# Patient Record
Sex: MALE | Race: BLACK OR AFRICAN AMERICAN | NOT HISPANIC OR LATINO | URBAN - METROPOLITAN AREA
[De-identification: names, ages, dates, MRNs, and addresses within clinical notes are randomized per-mention and may not be internally consistent; named-entity substitution may affect disease eponyms.]

---

## 2023-11-05 ENCOUNTER — EMERGENCY (EMERGENCY)
Facility: HOSPITAL | Age: 28
LOS: 1 days | Discharge: HOME | End: 2023-11-05
Admitting: EMERGENCY MEDICINE
Payer: MEDICAID

## 2023-11-05 VITALS
DIASTOLIC BLOOD PRESSURE: 78 MMHG | OXYGEN SATURATION: 100 % | TEMPERATURE: 98 F | HEART RATE: 58 BPM | SYSTOLIC BLOOD PRESSURE: 117 MMHG | RESPIRATION RATE: 18 BRPM

## 2023-11-05 PROCEDURE — 99284 EMERGENCY DEPT VISIT MOD MDM: CPT | Mod: 25

## 2023-11-05 PROCEDURE — 12001 RPR S/N/AX/GEN/TRNK 2.5CM/<: CPT

## 2023-11-05 RX ORDER — TETANUS TOXOID, REDUCED DIPHTHERIA TOXOID AND ACELLULAR PERTUSSIS VACCINE, ADSORBED 5; 2.5; 8; 8; 2.5 [IU]/.5ML; [IU]/.5ML; UG/.5ML; UG/.5ML; UG/.5ML
0.5 SUSPENSION INTRAMUSCULAR ONCE
Refills: 0 | Status: COMPLETED | OUTPATIENT
Start: 2023-11-05 | End: 2023-11-05

## 2023-11-05 RX ADMIN — TETANUS TOXOID, REDUCED DIPHTHERIA TOXOID AND ACELLULAR PERTUSSIS VACCINE, ADSORBED 0.5 MILLILITER(S): 5; 2.5; 8; 8; 2.5 SUSPENSION INTRAMUSCULAR at 21:27

## 2023-11-05 NOTE — ED PROCEDURE NOTE - NS_EDPROVIDERDISPOUSERTYPE_ED_A_ED
How Severe Is Your Scar?: moderate Is This A New Presentation, Or A Follow-Up?: Scars I have personally evaluated and examined the patient. The Attending was available to me as a supervising provider if needed.

## 2023-11-05 NOTE — ED PROVIDER NOTE - PROGRESS NOTE DETAILS
GISSELL BANG:  Lac repair performed; procedure tolerated well.  RN to administer adacel.  Pt medically stable for discharge.  Strict return precautions given.  Pt to follow up in ED for suture removal.

## 2023-11-05 NOTE — ED PROVIDER NOTE - NSFOLLOWUPINSTRUCTIONS_ED_ALL_ED_FT
Follow up with your PMD within 48-72 hours for wound check. Keep sutures covered and dry for 24 hours then clean with soap and tepid water daily.  Apply bacitracin and cover.  Return to ED for suture removal in 7 to 10 days. Please return immediately to the Emergency Department if you have any worsening or change in your condition or if you have any other problems or concerns at all, including but not limited to any increased pain, redness, streaking (red lines), swelling, fever, chills.     LACERATION - Ambulatory Care    Laceration    AMBULATORY CARE:    A laceration is an injury to the skin and the soft tissue underneath it. Lacerations can happen anywhere on the body.    Common symptoms include the following:    Injury or wound to skin and tissue of any shape size that looks like a cut, tear, or gash    Edges of the wound may be close together or wide apart    Pain, bleeding, bruising, or swelling    Numbness around the wound    Decreased movement in an area below the wound  Seek care immediately if:    You have heavy bleeding or bleeding that does not stop after 10 minutes of holding firm, direct pressure over the wound.    Your wound opens up.  Call your doctor if:    You have a fever or chills.    Your laceration is red, warm, or swollen.    You have red streaks on your skin coming from your wound.    You have white or yellow drainage from the wound that smells bad.    You have pain that gets worse, even after treatment.    You have questions or concerns about your condition or care.  Treatment for a laceration includes care to stop any bleeding. Your healthcare provider will stop the bleeding by applying pressure to the wound. He or she may need to check your wound for foreign objects and clean it to decrease the chance of infection. Your laceration may be closed with stitches, staples, tissue glue, or medical strips. Ask your healthcare provider if you need a tetanus shot.      Medicines: You may need any of the following:    Prescription pain medicine may be given. Ask your healthcare provider how to take this medicine safely. Some prescription pain medicines contain acetaminophen. Do not take other medicines that contain acetaminophen without talking to your healthcare provider. Too much acetaminophen may cause liver damage. Prescription pain medicine may cause constipation. Ask your healthcare provider how to prevent or treat constipation.    Antibiotics help treat or prevent a bacterial infection.    Take your medicine as directed. Contact your healthcare provider if you think your medicine is not helping or if you have side effects. Tell your provider if you are allergic to any medicine. Keep a list of the medicines, vitamins, and herbs you take. Include the amounts, and when and why you take them. Bring the list or the pill bottles to follow-up visits. Carry your medicine list with you in case of an emergency.  Care for your wound as directed:    Do not get your wound wet until your healthcare provider says it is okay. Do not soak your wound in water. Do not go swimming until your healthcare provider says it is okay. Carefully wash the wound with soap and water. Gently pat the area dry or allow it to air dry.    Change your bandages when they get wet, dirty, or after washing. Apply new, clean bandages as directed. Do not apply elastic bandages or tape too tight. Do not put powders or lotions over your incision.    Apply antibiotic ointment as directed. Your healthcare provider may give you antibiotic ointment to put over your wound if you have stitches. If you have strips of tape over your incision, let them dry up and fall off on their own. If they do not fall off within 14 days, gently remove them. If you have glue over your wound, do not remove or pick at it. If your glue comes off, do not replace it with glue that you have at home.    Check your wound every day for signs of infection, such as swelling, redness, or pus.  Self-care:    Apply ice on your wound for 15 to 20 minutes every hour or as directed. Use an ice pack, or put crushed ice in a plastic bag. Cover it with a towel. Ice helps prevent tissue damage and decreases swelling and pain.    Use a splint as directed. A splint will decrease movement and stress on your wound. It may help it heal faster. A splint may be used for lacerations over joints or areas of your body that bend. Ask your healthcare provider how to apply and remove a splint.    Decrease scarring of your wound by applying ointments as directed. Do not apply ointments until your healthcare provider says it is okay. You may need to wait until your wound is healed. Ask which ointment to buy and how often to use it. After your wound is healed, use sunscreen over the area when you are out in the sun. You should do this for at least 6 months to 1 year after your injury.  Follow up with your doctor as directed: You will need to return in 3 to 14 days to have stitches or staples removed. Write down your questions so you remember to ask them during your visits.

## 2023-11-05 NOTE — ED PROVIDER NOTE - PHYSICAL EXAMINATION
RLE:  + 2 cm laceration at mid shin; +oozing bleeding; nontender; no bony tenderness; no palpable deformities; 5/5 strength; sensation intact to light touch; < 2 sec cap refill; 2+ pulses

## 2023-11-05 NOTE — ED PROVIDER NOTE - OBJECTIVE STATEMENT
Pt is a 27 y/o M with no pertinent PMHx p/w laceration today.  Pt reports 4 hours ago, he bumped right shin against a table with which he sustained a laceration at anterior mid shin.  Pt reports mild pain.  He states last tetanus shot was > 10 years ago.  He denies any fevers, chills, numbness.  [employer]  ID#[id number].

## 2023-11-05 NOTE — ED PROVIDER NOTE - CLINICAL SUMMARY MEDICAL DECISION MAKING FREE TEXT BOX
Pt is a 27 y/o M with no pertinent PMHx p/w laceration today.  Pt reports 4 hours ago, he bumped right shin against a table with which he sustained a laceration at anterior mid shin.  Pt reports mild pain.  He states last tetanus shot was > 10 years ago.  He denies any fevers, chills, numbness.  [employer]  ID#[id number].  This is a patient with a laceration; no clinically concerning for associated fracture.  Plan to repair laceration and order adacel.  Plan to discharge patient.

## 2023-11-05 NOTE — ED PROVIDER NOTE - PATIENT PORTAL LINK FT
You can access the FollowMyHealth Patient Portal offered by [facility] by registering at the following website: [email or web address]. By joining [facility]’s FollowMyHealth portal, you will also be able to view your health information using other applications (apps) compatible with our system.

## 2023-11-05 NOTE — ED ADULT TRIAGE NOTE - CHIEF COMPLAINT QUOTE
Pt AOX4 speaks Irish; c/o laceration to Right shin, has approx 3cm lac oozing blood  Pt is from the [facility] Pt AOX4 speaks Sudanese; c/o laceration to Right shin, has approx 3cm lac oozing blood  [facility], no significant past medical Hx

## 2023-11-05 NOTE — ED PROCEDURE NOTE - CPROC ED TIME OUT STATEMENT1
“Patient's name, , procedure and correct site were confirmed during the [place] Timeout.” contact guard

## 2025-05-14 NOTE — ED PROCEDURE NOTE - LACERATION CAUSED BY
Start B12 1000 mcg over the counter-take one by mouth each day.     We are sending a referral for memory testing. If you are not contacted to schedule this in the next couple weeks, please call our clinic to check in.    struck table with shin